# Patient Record
Sex: FEMALE | Race: WHITE | NOT HISPANIC OR LATINO | Employment: OTHER | URBAN - METROPOLITAN AREA
[De-identification: names, ages, dates, MRNs, and addresses within clinical notes are randomized per-mention and may not be internally consistent; named-entity substitution may affect disease eponyms.]

---

## 2017-01-18 ENCOUNTER — OUTSIDE FACILITY SERVICE (OUTPATIENT)
Dept: CARDIAC SURGERY | Facility: CLINIC | Age: 82
End: 2017-01-18

## 2017-01-18 ENCOUNTER — PREP FOR SURGERY (OUTPATIENT)
Dept: CARDIAC SURGERY | Facility: CLINIC | Age: 82
End: 2017-01-18

## 2017-01-18 DIAGNOSIS — D49.89: Primary | ICD-10-CM

## 2017-01-18 PROCEDURE — 33120 EXC ICAR TUM RESC W/CARD BYP: CPT | Performed by: THORACIC SURGERY (CARDIOTHORACIC VASCULAR SURGERY)

## 2017-01-18 PROCEDURE — 76998 US GUIDE INTRAOP: CPT | Performed by: THORACIC SURGERY (CARDIOTHORACIC VASCULAR SURGERY)

## 2017-02-24 RX ORDER — MEMANTINE HYDROCHLORIDE 7 MG/1
CAPSULE, EXTENDED RELEASE ORAL
Refills: 11 | COMMUNITY
Start: 2016-12-26

## 2017-02-24 RX ORDER — CITALOPRAM 20 MG/1
TABLET ORAL
Refills: 3 | COMMUNITY
Start: 2016-12-27

## 2017-02-24 RX ORDER — DONEPEZIL HYDROCHLORIDE 10 MG/1
TABLET, FILM COATED ORAL
Refills: 3 | COMMUNITY
Start: 2016-12-27

## 2017-02-24 RX ORDER — SIMVASTATIN 10 MG
TABLET ORAL
Refills: 1 | COMMUNITY
Start: 2016-12-27

## 2017-02-24 RX ORDER — CHLORAL HYDRATE 500 MG
CAPSULE ORAL
COMMUNITY

## 2017-02-24 RX ORDER — METHYLPREDNISOLONE 4 MG/1
TABLET ORAL
Refills: 0 | COMMUNITY
Start: 2016-12-06 | End: 2017-02-27

## 2017-02-24 RX ORDER — DOXYCYCLINE HYCLATE 100 MG/1
CAPSULE ORAL
Refills: 0 | COMMUNITY
Start: 2016-11-17

## 2017-02-24 RX ORDER — NAPROXEN 500 MG/1
TABLET ORAL
Refills: 5 | COMMUNITY
Start: 2016-12-27 | End: 2017-02-24

## 2017-02-24 RX ORDER — METOPROLOL SUCCINATE 100 MG/1
TABLET, EXTENDED RELEASE ORAL
Refills: 3 | COMMUNITY
Start: 2016-12-27 | End: 2017-02-27 | Stop reason: ALTCHOICE

## 2017-02-24 RX ORDER — FLUTICASONE PROPIONATE 50 MCG
SPRAY, SUSPENSION (ML) NASAL
Refills: 11 | COMMUNITY
Start: 2016-12-27

## 2017-02-27 ENCOUNTER — OFFICE VISIT (OUTPATIENT)
Dept: CARDIAC SURGERY | Facility: CLINIC | Age: 82
End: 2017-02-27

## 2017-02-27 VITALS
HEART RATE: 61 BPM | TEMPERATURE: 98 F | DIASTOLIC BLOOD PRESSURE: 78 MMHG | RESPIRATION RATE: 20 BRPM | WEIGHT: 160 LBS | HEIGHT: 65 IN | SYSTOLIC BLOOD PRESSURE: 145 MMHG | BODY MASS INDEX: 26.66 KG/M2 | OXYGEN SATURATION: 96 %

## 2017-02-27 DIAGNOSIS — D49.89: Primary | ICD-10-CM

## 2017-02-27 PROCEDURE — 99024 POSTOP FOLLOW-UP VISIT: CPT | Performed by: THORACIC SURGERY (CARDIOTHORACIC VASCULAR SURGERY)

## 2017-02-27 RX ORDER — APIXABAN 2.5 MG/1
TABLET, FILM COATED ORAL
Refills: 0 | COMMUNITY
Start: 2017-02-17

## 2017-02-27 RX ORDER — METOPROLOL SUCCINATE 25 MG/1
TABLET, EXTENDED RELEASE ORAL
Refills: 0 | COMMUNITY
Start: 2017-02-05

## 2017-02-27 RX ORDER — DILTIAZEM HYDROCHLORIDE 240 MG/1
CAPSULE, COATED, EXTENDED RELEASE ORAL
Refills: 0 | COMMUNITY
Start: 2017-02-05

## 2017-02-27 RX ORDER — MONTELUKAST SODIUM 4 MG/1
TABLET, CHEWABLE ORAL
Refills: 5 | COMMUNITY
Start: 2017-02-22

## 2017-02-27 NOTE — PROGRESS NOTES
Leonarda Chauhan is a 84 y.o. female s/p AV tumor excision x2. She denies any signs or symptoms of myocardial ischemia, cerebrovascular event, infection, or valve malfunction (patient had a preop CVA secondary to tumor embolization). She reports good exercise tolerance. 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    Sternum is stable, clean, dry and intact.   RRR. No murmurs.  CTA B/L.  No edema, cyanosis, clubbing.  GCS 15, no neurologic deficit.    She appears to be doing well.  I encouraged her to continue sternal precations for another six weeks.    We will follow her on a PRN basis.

## 2017-07-03 ENCOUNTER — INPATIENT HOSPITAL (OUTPATIENT)
Dept: URBAN - METROPOLITAN AREA HOSPITAL 76 | Facility: HOSPITAL | Age: 82
End: 2017-07-03

## 2017-07-03 DIAGNOSIS — R10.31 RIGHT LOWER QUADRANT PAIN: ICD-10-CM

## 2017-07-03 DIAGNOSIS — R10.32 LEFT LOWER QUADRANT PAIN: ICD-10-CM

## 2017-07-03 DIAGNOSIS — K59.00 CONSTIPATION, UNSPECIFIED: ICD-10-CM

## 2017-07-03 DIAGNOSIS — R11.0 NAUSEA: ICD-10-CM

## 2017-07-03 PROCEDURE — 99222 1ST HOSP IP/OBS MODERATE 55: CPT | Performed by: NURSE PRACTITIONER

## 2017-07-04 ENCOUNTER — INPATIENT HOSPITAL (OUTPATIENT)
Dept: URBAN - METROPOLITAN AREA HOSPITAL 76 | Facility: HOSPITAL | Age: 82
End: 2017-07-04

## 2017-07-04 DIAGNOSIS — R10.32 LEFT LOWER QUADRANT PAIN: ICD-10-CM

## 2017-07-04 DIAGNOSIS — R10.31 RIGHT LOWER QUADRANT PAIN: ICD-10-CM

## 2017-07-04 DIAGNOSIS — K59.00 CONSTIPATION, UNSPECIFIED: ICD-10-CM

## 2017-07-04 DIAGNOSIS — K83.1 OBSTRUCTION OF BILE DUCT: ICD-10-CM

## 2017-07-04 DIAGNOSIS — K80.71 CALCULUS OF GALLBLADDER AND BILE DUCT WITHOUT CHOLECYSTITIS: ICD-10-CM

## 2017-07-04 DIAGNOSIS — R74.8 ABNORMAL LEVELS OF OTHER SERUM ENZYMES: ICD-10-CM

## 2017-07-04 DIAGNOSIS — R11.0 NAUSEA: ICD-10-CM

## 2017-07-04 DIAGNOSIS — K85.10 BILIARY ACUTE PANCREATITIS WITHOUT NECROSIS OR INFECTION: ICD-10-CM

## 2017-07-04 PROCEDURE — 99231 SBSQ HOSP IP/OBS SF/LOW 25: CPT | Performed by: INTERNAL MEDICINE

## 2017-07-05 PROCEDURE — 43262 ENDO CHOLANGIOPANCREATOGRAPH: CPT | Performed by: INTERNAL MEDICINE

## 2017-07-05 PROCEDURE — 43264 ERCP REMOVE DUCT CALCULI: CPT | Performed by: INTERNAL MEDICINE

## 2017-07-20 ENCOUNTER — OFFICE (OUTPATIENT)
Dept: URBAN - METROPOLITAN AREA CLINIC 64 | Facility: CLINIC | Age: 82
End: 2017-07-20

## 2017-07-20 VITALS
SYSTOLIC BLOOD PRESSURE: 159 MMHG | WEIGHT: 153 LBS | HEART RATE: 82 BPM | HEIGHT: 65 IN | DIASTOLIC BLOOD PRESSURE: 86 MMHG

## 2017-07-20 DIAGNOSIS — K85.10 BILIARY ACUTE PANCREATITIS WITHOUT NECROSIS OR INFECTION: ICD-10-CM

## 2017-07-20 PROCEDURE — 99213 OFFICE O/P EST LOW 20 MIN: CPT | Performed by: NURSE PRACTITIONER

## 2017-12-21 ENCOUNTER — HOSPITAL ENCOUNTER (OUTPATIENT)
Dept: GENERAL RADIOLOGY | Facility: HOSPITAL | Age: 82
Discharge: HOME | End: 2017-12-21
Attending: PHYSICAL MEDICINE & REHABILITATION | Admitting: PHYSICAL MEDICINE & REHABILITATION

## 2017-12-26 ENCOUNTER — HOSPITAL ENCOUNTER (OUTPATIENT)
Dept: GENERAL RADIOLOGY | Facility: HOSPITAL | Age: 82
Discharge: HOME | End: 2017-12-26
Attending: PHYSICAL MEDICINE & REHABILITATION | Admitting: PHYSICAL MEDICINE & REHABILITATION

## 2018-01-01 ENCOUNTER — HOSPITAL ENCOUNTER (OUTPATIENT)
Dept: GENERAL RADIOLOGY | Facility: HOSPITAL | Age: 83
Discharge: HOME | End: 2018-01-01
Attending: PHYSICAL MEDICINE & REHABILITATION | Admitting: PHYSICAL MEDICINE & REHABILITATION

## 2018-01-03 ENCOUNTER — HOSPITAL ENCOUNTER (OUTPATIENT)
Dept: GENERAL RADIOLOGY | Facility: HOSPITAL | Age: 83
Discharge: HOME | End: 2018-01-03
Attending: PHYSICAL MEDICINE & REHABILITATION | Admitting: PHYSICAL MEDICINE & REHABILITATION

## 2018-07-20 ENCOUNTER — HOSPITAL ENCOUNTER (OUTPATIENT)
Dept: ORTHOPEDIC SURGERY | Facility: CLINIC | Age: 83
Discharge: HOME | End: 2018-07-20
Attending: ORTHOPAEDIC SURGERY | Admitting: ORTHOPAEDIC SURGERY

## 2018-08-17 ENCOUNTER — HOSPITAL ENCOUNTER (OUTPATIENT)
Dept: ORTHOPEDIC SURGERY | Facility: CLINIC | Age: 83
Discharge: HOME | End: 2018-08-17
Attending: ORTHOPAEDIC SURGERY | Admitting: ORTHOPAEDIC SURGERY

## 2018-11-06 ENCOUNTER — HOSPITAL ENCOUNTER (OUTPATIENT)
Dept: CARDIOLOGY | Facility: HOSPITAL | Age: 83
Discharge: HOME | End: 2018-11-06
Attending: INTERNAL MEDICINE | Admitting: INTERNAL MEDICINE

## 2019-06-12 LAB
ALBUMIN SERPL-MCNC: 3.9 G/DL (ref 3.5–4.7)
ALBUMIN/GLOB SERPL: 1.1 {RATIO} (ref 1.2–2.2)
ALP SERPL-CCNC: 65 IU/L (ref 39–117)
ALT SERPL-CCNC: 11 IU/L (ref 0–32)
AMBIG ABBREV CMP14 DEFAULT: NORMAL
AST SERPL-CCNC: 18 IU/L (ref 0–40)
BASOPHILS # BLD AUTO: 0 X10E3/UL (ref 0–0.2)
BASOPHILS NFR BLD AUTO: 1 %
BILIRUB SERPL-MCNC: 0.5 MG/DL (ref 0–1.2)
BUN SERPL-MCNC: 19 MG/DL (ref 8–27)
BUN/CREAT SERPL: 23 (ref 12–28)
CALCIUM SERPL-MCNC: 10.4 MG/DL (ref 8.7–10.3)
CHLORIDE SERPL-SCNC: 102 MMOL/L (ref 96–106)
CO2 SERPL-SCNC: 25 MMOL/L (ref 20–29)
CREAT SERPL-MCNC: 0.84 MG/DL (ref 0.57–1)
EOSINOPHIL # BLD AUTO: 0.4 X10E3/UL (ref 0–0.4)
EOSINOPHIL NFR BLD AUTO: 9 %
ERYTHROCYTE [DISTWIDTH] IN BLOOD BY AUTOMATED COUNT: 13.2 % (ref 12.3–15.4)
GLOBULIN SER CALC-MCNC: 3.5 G/DL (ref 1.5–4.5)
GLUCOSE SERPL-MCNC: 83 MG/DL (ref 65–99)
HCT VFR BLD AUTO: 37.3 % (ref 34–46.6)
HGB BLD-MCNC: 12 G/DL (ref 11.1–15.9)
IMM GRANULOCYTES # BLD AUTO: 0 X10E3/UL (ref 0–0.1)
IMM GRANULOCYTES NFR BLD AUTO: 0 %
IRON SATN MFR SERPL: 26 % (ref 15–55)
IRON SERPL-MCNC: 57 UG/DL (ref 27–139)
LYMPHOCYTES # BLD AUTO: 1.3 X10E3/UL (ref 0.7–3.1)
LYMPHOCYTES NFR BLD AUTO: 28 %
MCH RBC QN AUTO: 30.3 PG (ref 26.6–33)
MCHC RBC AUTO-ENTMCNC: 32.2 G/DL (ref 31.5–35.7)
MCV RBC AUTO: 94 FL (ref 79–97)
MONOCYTES # BLD AUTO: 0.4 X10E3/UL (ref 0.1–0.9)
MONOCYTES NFR BLD AUTO: 8 %
NEUTROPHILS # BLD AUTO: 2.6 X10E3/UL (ref 1.4–7)
NEUTROPHILS NFR BLD AUTO: 54 %
PLATELET # BLD AUTO: 228 X10E3/UL (ref 150–450)
POTASSIUM SERPL-SCNC: 4.8 MMOL/L (ref 3.5–5.2)
PROT SERPL-MCNC: 7.4 G/DL (ref 6–8.5)
RBC # BLD AUTO: 3.96 X10E6/UL (ref 3.77–5.28)
SODIUM SERPL-SCNC: 140 MMOL/L (ref 134–144)
TIBC SERPL-MCNC: 219 UG/DL (ref 250–450)
TSH SERPL DL<=0.005 MIU/L-ACNC: 2.96 UIU/ML (ref 0.45–4.5)
UIBC SERPL-MCNC: 162 UG/DL (ref 118–369)
WBC # BLD AUTO: 4.8 X10E3/UL (ref 3.4–10.8)

## 2019-11-05 ENCOUNTER — OFFICE VISIT (OUTPATIENT)
Dept: CARDIOLOGY | Facility: CLINIC | Age: 84
End: 2019-11-05

## 2019-11-05 VITALS
DIASTOLIC BLOOD PRESSURE: 67 MMHG | BODY MASS INDEX: 18.58 KG/M2 | HEART RATE: 70 BPM | HEIGHT: 66 IN | WEIGHT: 115.6 LBS | SYSTOLIC BLOOD PRESSURE: 120 MMHG

## 2019-11-05 DIAGNOSIS — I35.9 AORTIC VALVE DISEASE: Primary | ICD-10-CM

## 2019-11-05 DIAGNOSIS — I34.0 NONRHEUMATIC MITRAL VALVE REGURGITATION: ICD-10-CM

## 2019-11-05 DIAGNOSIS — I48.0 PAROXYSMAL ATRIAL FIBRILLATION (HCC): ICD-10-CM

## 2019-11-05 PROCEDURE — 93000 ELECTROCARDIOGRAM COMPLETE: CPT | Performed by: INTERNAL MEDICINE

## 2019-11-05 PROCEDURE — 99213 OFFICE O/P EST LOW 20 MIN: CPT | Performed by: INTERNAL MEDICINE

## 2019-11-05 RX ORDER — CYCLOSPORINE 0.5 MG/ML
1 EMULSION OPHTHALMIC 2 TIMES DAILY
COMMUNITY

## 2019-11-05 RX ORDER — ECHINACEA 400 MG
CAPSULE ORAL DAILY
COMMUNITY

## 2019-11-05 RX ORDER — LORATADINE 10 MG/1
CAPSULE, LIQUID FILLED ORAL
COMMUNITY

## 2019-11-05 RX ORDER — ASPIRIN 81 MG/1
81 TABLET ORAL DAILY
COMMUNITY

## 2019-11-05 RX ORDER — MULTIPLE VITAMINS W/ MINERALS TAB 9MG-400MCG
1 TAB ORAL DAILY
COMMUNITY

## 2019-11-05 RX ORDER — TRAVOPROST OPHTHALMIC SOLUTION 0.04 MG/ML
1 SOLUTION OPHTHALMIC EVERY EVENING
COMMUNITY

## 2019-11-05 RX ORDER — LANOLIN ALCOHOL/MO/W.PET/CERES
400 CREAM (GRAM) TOPICAL DAILY
COMMUNITY

## 2019-11-05 RX ORDER — MELATONIN
1000 DAILY
COMMUNITY

## 2019-11-05 NOTE — PROGRESS NOTES
"Visit Type:  Follow-up Visit  Primary Care Provider:  Isaiah Delaney MD     Chief Complaint:  3 month : .     History of Present Illness:          Dear Isaiah         weight loss,  Anorexia,  status post orthopedic surgery.Followup  after  hx of CVA with visual loss and cardiac surgery for aortic valve fibroelastoma, paroxysmal Atrial Fibrillation      Mrs. Leonarda Chauhan Is a delightful 87 years old lady  who was seen in our office accompanied by her daughter.  Patient has had  HX  of atrial fibrillation in April 2016.  She has not had any palpitations.  She denies any chest pain dyspnea presyncope or syncope.     Indication for EKG: History of atrial fibrillation previously abnormal EKG  Normal sinus rhythm with a rate of 71 PAC.  Old anteroseptal myocardial infarction nonspecific ST-T changes.  No significant change as compared to previous EKG of February 5, 2019 except for no PVCs are noted in today's EKG.  KY interval today was 160 ms QRS duration 94 ms QTc 393 ms QRS axis 92.  HER LAST EKG  ON FEBRUARY 5, 2019 showed normal sinus rhythm rightward axis PVCs probable old septal MI nonspecific ST-T changes.       She seems to be in good SPIRITS. /67   Pulse 70   Ht 167.6 cm (66\")   Wt 52.4 kg (115 lb 9.6 oz)   BMI 18.66 kg/m²    Her last echocardiogram showed LV ejection fraction of 50-55% mild to moderate mitral regurgitation.   I he ordered [facility] tsh comprehensive metabolic profile but it has not been done yet and I will send those papers again.     A/P     1-   Paroxysmal atrial fibrillation.  Patient is not a candidate for long-term anticoagulation because her balance issues and tendency to fall.     2-Moderate mitral regurgitation.     3-   Aortic valve surgery with resection of fibroelastoma status post previous CVA secondary to embolic episode     Tthank you very much for allowing us to participate in the care of your patients                Problems: Active problems were reviewed with the patient " during this visit.  Medications: Medications were reviewed with the patient during this visit.  Allergies: Allergies were reviewed with the patient during this visit.                   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 Thompson     Active Medications (reviewed today):  CLARITIN 10 MG ORAL TABLET (LORATADINE) Take 1 tablet by mouth daily  SIMVASTATIN 10 MG ORAL TABLET (SIMVASTATIN) Take 1 tablet by mouth daily  ELIQUIS 2.5 MG ORAL TABLET (APIXABAN) Take one (1) tablet by mouth twice a day  FLONASE 50 MCG/ACT NASAL SUSPENSION (FLUTICASONE PROPIONATE) Daily  ARICEPT TABLET (DONEPEZIL HCL TABS) Take 1 tablet by mouth daily  ADULT ASPIRIN EC LOW STRENGTH 81 MG ORAL TABLET DELAYED RELEASE (ASPIRIN) Take 1 tablet by mouth daily  RESTASIS EMULSION (CYCLOSPORINE EMUL) opth gtt, 1 gt twice daily     Current Allergies (reviewed today):  PCN (Critical)  ADVIL (IBUPROFEN CAPS) (Critical)  DOXYCYCLINE HYCLATE (DOXYCYCLINE HYCLATE TABS) (Critical)  * LATEX (Critical)  SULFADIAZINE (SULFADIAZINE TABS) (Critical)  * PEACHES (Critical)  DEMEROL (Moderate)  ERYTHROMYCIN (Moderate)  LORTAB (Moderate)  * VITAMIN B12 (Moderate)  * NATURAL RUBBER (Moderate)     Family History Summary:      Reviewed history Last on 02/05/2019 and no changes required:05/07/2019  Father - Has Family History of Stroke/CVA - Entered On: 7/20/2018  Mother - Has Family  History of Heart Disease - Entered On: 7/20/2018     General Comments - FH:  Heart disease:  mother  Stroke:  father  Cancer on Paternal side      Social History:     Reviewed history from 03/24/2015 and no changes required:                5 children         Retired           Risk Factors:      Smoked Tobacco Use:  Former smoker     Cigarettes:  Yes -- <1 pack(s) per day,    Pack-years:  half ppd         Year started:  age 20's         Year quit:  1963        Years Since Last Quit:  56  Smokeless Tobacco Use:  Never  Passive smoke exposure:  no  Drug use:  no  Caffeine use:  0 drinks per day  Alcohol use:  no  Exercise:  yes     Type of Exercise:  when weather is good walk to corner and back /mow grass   Seatbelt use:  100 %  Sun Exposure:  occasionally     Family History Risk Factors:     Family History of MI in females < 65 years old:  no     Family History of MI in males < 55 years old:  no           Review of Systems   General: denies fevers, chills, sweats, anorexia, fatigue, malaise, weight loss  Eyes: denies blurring, diplopia, irritation, discharge, vision loss, eye pain, photophobia  Cardiovascular:  Hx of aortic valve surgery with removal of aortic valve fibroelastoma.  Mild-to-moderate coronary artery disease by cardiac catheterization.  No coronary artery stenting CABG was deemed necessary.  Paroxysmal atrial fibrillation.  Patient   is now in sinus rhythm.  Atrial septal defect of secundum type  Respiratory: Denies cough, dyspnea, excessive sputum, hemoptysis, wheezing  Gastrointestinal: Recent history pancreatitis common bile duct stones status post ERCP  Musculoskeletal: denies back pain, joint pain, joint swelling, muscle cramps, muscle weakness, stiffness, arthritis  Skin: denies rash, itching, dryness, suspicious lesions  Neurologic: denies transient paralysis, weakness, paresthesias, seizures, syncope, tremors, vertigo  Psychiatric: denies depression, anxiety, memory loss, mental  disturbance, suicidal ideation, hallucinations, paranoia  Endocrine: denies cold intolerance, heat intolerance, polydipsia, polyphagia, polyuria, weight change  Hematologic/Lymphatic:   Recent anemia        Physical Exam     General:      well developed, well nourished, in no acute distress.    Neck:      no masses, thyromegaly, or abnormal cervical nodes.   no JVD. No carotid bruits  Lungs:      clear bilaterally to auscultation.    Heart:      non-displaced PMI, chest non-tender; regular rate and rhythm, S1, S2 with  soft systolic murmur at the apexnormal split S2.    Pulses:      pulses normal in all 4 extremities.    Extremities:       no edema

## 2020-05-07 ENCOUNTER — OFFICE VISIT (OUTPATIENT)
Dept: CARDIOLOGY | Facility: CLINIC | Age: 85
End: 2020-05-07

## 2020-05-07 VITALS
HEIGHT: 66 IN | HEART RATE: 74 BPM | SYSTOLIC BLOOD PRESSURE: 97 MMHG | WEIGHT: 125 LBS | BODY MASS INDEX: 20.09 KG/M2 | DIASTOLIC BLOOD PRESSURE: 60 MMHG

## 2020-05-07 DIAGNOSIS — E78.2 MIXED HYPERLIPIDEMIA: ICD-10-CM

## 2020-05-07 DIAGNOSIS — I77.9 CAROTID ARTERY DISEASE, UNSPECIFIED LATERALITY, UNSPECIFIED TYPE (HCC): Primary | ICD-10-CM

## 2020-05-07 DIAGNOSIS — I38 HEART VALVE DISEASE: ICD-10-CM

## 2020-05-07 DIAGNOSIS — I10 HYPERTENSION, UNSPECIFIED TYPE: ICD-10-CM

## 2020-05-07 DIAGNOSIS — I48.0 PAROXYSMAL ATRIAL FIBRILLATION (HCC): ICD-10-CM

## 2020-05-07 DIAGNOSIS — I63.9 CEREBROVASCULAR ACCIDENT (CVA), UNSPECIFIED MECHANISM (HCC): ICD-10-CM

## 2020-05-07 PROBLEM — I48.91 ATRIAL FIBRILLATION (HCC): Status: ACTIVE | Noted: 2020-05-07

## 2020-05-07 PROBLEM — E78.5 HYPERLIPIDEMIA: Status: ACTIVE | Noted: 2020-05-07

## 2020-05-07 PROCEDURE — 99443 PR PHYS/QHP TELEPHONE EVALUATION 21-30 MIN: CPT | Performed by: INTERNAL MEDICINE

## 2020-05-07 NOTE — PROGRESS NOTES
"Cardiology Telephone Visit    Encounter Date:  05/07/2020    Patient ID:   Leonarda Chauhan is a 88 y.o. female.    Reason For Followup:  Followup  after  hx of CVA with visual loss and cardiac surgery for aortic valve fibroelastoma, paroxysmal Atrial Fibrillation    Brief Clinical History:  Dear Dr. Delaney, Prashant Mazariegos MD    I had the pleasure of performing a virtual check-in with Leonarda Chauhan today. As you are well aware, this is a 88 y.o. female who was seen in our office accompanied by her daughter.  Patient has had  HX  of atrial fibrillation in April 2016.  She has not had any palpitations.  She denies any chest pain dyspnea presyncope or syncope.     Her last echocardiogram showed LV ejection fraction of 50-55% mild to moderate mitral regurgitation.          Interval History:  Patient denies any symptoms of chest pain shortness of breath dizziness or syncope  No lower extremity edema  Labs from last June discussed with the patient      Assessment & Plan    Impressions:  1-   Paroxysmal atrial fibrillation.  Patient is not a candidate for long-term anticoagulation because her balance issues and tendency to fall.     2-Moderate mitral regurgitation.     3-   Aortic valve surgery with resection of fibroelastoma status post previous CVA secondary to embolic episode         Recommendations:  Labs from last June discussed with the patient  Stable from cardiac standpoint  Continue current medical therapy  Patient is scheduled to get some labs done with primary care physician this August  Follow-up in office in 6 months  Tthank you very much for allowing us to participate in the care of your patients    You have chosen to receive care through a telephone visit. Do you consent to use a telephone visit for your medical care today? Yes      Vitals:  Vitals:    05/07/20 1459   BP: 97/60   BP Location: Left arm   Pulse: 74   Weight: 56.7 kg (125 lb)   Height: 167.6 cm (66\")       Allergies:  Allergies   Allergen " Reactions   • Doxycycline Hyclate Unknown (See Comments)   • Latex Unknown (See Comments)   • Peach  [Prunus Persica] Itching   • Meperidine Nausea And Vomiting   • Advil [Ibuprofen]    • Erythromycin    • Lortab [Hydrocodone-Acetaminophen]    • Penicillins    • Sulfa Antibiotics        Medication Review:     Current Outpatient Medications:   •  aspirin 81 MG EC tablet, Take 81 mg by mouth Daily., Disp: , Rfl:   •  calcium citrate-vitamin d (CITRACAL) 200-250 MG-UNIT tablet tablet, Take  by mouth 2 (Two) Times a Day., Disp: , Rfl:   •  cholecalciferol (VITAMIN D3) 25 MCG (1000 UT) tablet, Take 1,000 Units by mouth Daily., Disp: , Rfl:   •  citalopram (CeleXA) 20 MG tablet, TAKE ONE (1) TABLET BY MOUTH EVERY DAY, Disp: , Rfl: 3  •  colestipol (COLESTID) 1 G tablet, TAKE ONE (1) TABLET BY MOUTH EVERY DAY OR AS NEEDED, Disp: , Rfl: 5  •  cycloSPORINE (RESTASIS) 0.05 % ophthalmic emulsion, 1 drop 2 (Two) Times a Day., Disp: , Rfl:   •  diltiaZEM CD (CARDIZEM CD) 240 MG 24 hr capsule, TAKE ONE (1) CAPSULE BY MOUTH EVERY DAY, Disp: , Rfl: 0  •  donepezil (ARICEPT) 10 MG tablet, TAKE ONE (1) TABLET BY MOUTH EVERY NIGHT AT BEDTIME, Disp: , Rfl: 3  •  doxycycline (VIBRAMYCIN) 100 MG capsule, TAKE ONE (1) CAPSULE BY MOUTH TWICE DAILY, Disp: , Rfl: 0  •  ELIQUIS 2.5 MG tablet tablet, TAKE ONE (1) TABLET BY MOUTH TWICE DAILY, Disp: , Rfl: 0  •  Flaxseed, Linseed, (FLAXSEED OIL) 1000 MG capsule, Take  by mouth Daily., Disp: , Rfl:   •  fluticasone (FLONASE) 50 MCG/ACT nasal spray, USE TWO (2) SPRAY IN EACH NOSTRIL EVERY DAY, Disp: , Rfl: 11  •  folic acid (FOLVITE) 400 MCG tablet, Take 400 mcg by mouth Daily., Disp: , Rfl:   •  Loratadine 10 MG capsule, Take  by mouth., Disp: , Rfl:   •  metoprolol succinate XL (TOPROL-XL) 25 MG 24 hr tablet, TAKE ONE (1) TABLET BY MOUTH EVERY DAY, Disp: , Rfl: 0  •  Multiple Vitamins-Minerals (MULTIVITAMIN WITH MINERALS) tablet tablet, Take 1 tablet by mouth Daily., Disp: , Rfl:   •  NAMENDA XR  7 MG capsule sustained-release 24 hr extended release capsule, TAKE ONE (1) CAPSULE BY MOUTH EVERY DAY, Disp: , Rfl: 11  •  Omega-3 Fatty Acids (FISH OIL) 1000 MG capsule capsule, Take  by mouth Daily With Breakfast., Disp: , Rfl:   •  simvastatin (ZOCOR) 10 MG tablet, TAKE ONE (1) TABLET BY MOUTH EVERY NIGHT AT BEDTIME, Disp: , Rfl: 1  •  travoprost, ABEBA free, (TRAVATAN) 0.004 % solution ophthalmic solution, 1 drop Every Evening. in affected eye(s), Disp: , Rfl:     Family History:  Family History   Problem Relation Age of Onset   • Coronary artery disease Mother    • Stroke Father        Past Medical History:  Past Medical History:   Diagnosis Date   • Anxiety and depression    • Atrial fibrillation (CMS/HCC)     paroxysmal    • Carotid artery disease (CMS/HCC)    • CHF (congestive heart failure) (CMS/HCC)    • Dementia (CMS/HCC)    • DJD (degenerative joint disease)    • GERD (gastroesophageal reflux disease)    • Glaucoma    • Heart valve disease     moderate MR   • Hyperlipidemia    • Hypertension    • Precancerous lesion     skin/ nose    • Pulmonary hypertension (CMS/HCC)    • Stroke (CMS/HCC)        Past surgical History:  Past Surgical History:   Procedure Laterality Date   • AORTIC VALVE SURGERY  2017    mass excision x2/ resection of fibroelastoma s/p previous CVA  secondary to embolic episode    • CATARACT EXTRACTION     • CHOLECYSTECTOMY     • KNEE ARTHROSCOPY     • OTHER SURGICAL HISTORY Left 2018    Hip IM nailing    • TOTAL ABDOMINAL HYSTERECTOMY         Social History:  Social History     Socioeconomic History   • Marital status:      Spouse name: Not on file   • Number of children: Not on file   • Years of education: Not on file   • Highest education level: Not on file   Tobacco Use   • Smoking status: Former Smoker     Packs/day: 0.50     Last attempt to quit: 1963     Years since quittin.5   • Smokeless tobacco: Never Used   Substance and Sexual Activity   • Alcohol  use: No   • Drug use: No   • Sexual activity: Defer       Review of Systems:  The following systems were reviewed as they relate to the cardiovascular system: Constitutional, Eyes, ENT, Cardiovascular, Respiratory, Gastrointestinal, Integumentary, Neurological, Psychiatric, Hematologic, Endocrine, Musculoskeletal, and Genitourinary. The pertinent cardiovascular findings are reported above with all other cardiovascular points within those systems being negative.    Diagnostic Study Review:     Current Electrocardiogram:  Procedures      NOTE: The following portions of the patient's history were reviewed and updated this visit as appropriate: allergies, current medications, past family history, past medical history, past social history, past surgical history and problem list.    A total of 25 minutes of medical discussion occurred during this encounter.      Novel Coronavirus (COVID-19) Disclaimer:     A proclamation declaring a national emergency concerning the Novel Coronavirus Disease (COVID-19) Outbreak was issued on March 13, 2020 at the direction of the .    This virtual visit was performed with the patient's consent in lieu of the patient's regularly scheduled appointment in order to provide the patient with the opportunity to maintain contact with their healthcare provider while also adhering to social distancing guidelines set forth by the CDC to reduce exposure to the Novel Coronavirus (COVID-19).  Any vital signs obtained during this visit were provided by the patient.